# Patient Record
Sex: FEMALE | Race: WHITE | NOT HISPANIC OR LATINO | Employment: FULL TIME | URBAN - METROPOLITAN AREA
[De-identification: names, ages, dates, MRNs, and addresses within clinical notes are randomized per-mention and may not be internally consistent; named-entity substitution may affect disease eponyms.]

---

## 2017-09-17 ENCOUNTER — HOSPITAL ENCOUNTER (INPATIENT)
Facility: HOSPITAL | Age: 27
LOS: 1 days | Discharge: HOME | DRG: 189 | End: 2017-09-19
Attending: EMERGENCY MEDICINE | Admitting: INTERNAL MEDICINE
Payer: SUBSIDIZED

## 2017-09-17 DIAGNOSIS — R10.9 INTRACTABLE ABDOMINAL PAIN: Primary | ICD-10-CM

## 2017-09-17 DIAGNOSIS — R10.9 ABDOMINAL PAIN: ICD-10-CM

## 2017-09-17 LAB
AMORPH URATE CRY URNS QL MICRO: ABNORMAL /HPF
BACTERIA UR QL AUTO: ABNORMAL /HPF
BILIRUB UR QL STRIP: NEGATIVE
CLARITY UR: ABNORMAL
COLOR UR: ABNORMAL
EXT PREG TEST URINE: NEGATIVE
GLUCOSE UR STRIP-MCNC: NEGATIVE MG/DL
HGB UR QL STRIP.AUTO: ABNORMAL
KETONES UR STRIP-MCNC: NEGATIVE MG/DL
LEUKOCYTE ESTERASE UR QL STRIP: ABNORMAL
NITRITE UR QL STRIP: NEGATIVE
NON-SQ EPI CELLS URNS QL MICRO: ABNORMAL /HPF
PH UR STRIP.AUTO: 6.5 [PH] (ref 5–9)
PROT UR STRIP-MCNC: NEGATIVE MG/DL
RBC #/AREA URNS AUTO: ABNORMAL /HPF
SP GR UR STRIP.AUTO: 1.02 (ref 1–1.03)
UROBILINOGEN UR QL STRIP.AUTO: 0.2 E.U./DL
WBC #/AREA URNS AUTO: ABNORMAL /HPF

## 2017-09-17 PROCEDURE — 96361 HYDRATE IV INFUSION ADD-ON: CPT

## 2017-09-17 PROCEDURE — 96375 TX/PRO/DX INJ NEW DRUG ADDON: CPT

## 2017-09-17 PROCEDURE — 85025 COMPLETE CBC W/AUTO DIFF WBC: CPT | Performed by: EMERGENCY MEDICINE

## 2017-09-17 PROCEDURE — 81001 URINALYSIS AUTO W/SCOPE: CPT | Performed by: EMERGENCY MEDICINE

## 2017-09-17 PROCEDURE — 81025 URINE PREGNANCY TEST: CPT | Performed by: EMERGENCY MEDICINE

## 2017-09-17 PROCEDURE — 36415 COLL VENOUS BLD VENIPUNCTURE: CPT | Performed by: EMERGENCY MEDICINE

## 2017-09-17 PROCEDURE — 80053 COMPREHEN METABOLIC PANEL: CPT | Performed by: EMERGENCY MEDICINE

## 2017-09-17 PROCEDURE — 96374 THER/PROPH/DIAG INJ IV PUSH: CPT

## 2017-09-17 RX ORDER — KETOROLAC TROMETHAMINE 30 MG/ML
30 INJECTION, SOLUTION INTRAMUSCULAR; INTRAVENOUS ONCE
Status: COMPLETED | OUTPATIENT
Start: 2017-09-17 | End: 2017-09-17

## 2017-09-17 RX ORDER — MORPHINE SULFATE 2 MG/ML
2 INJECTION, SOLUTION INTRAMUSCULAR; INTRAVENOUS ONCE
Status: COMPLETED | OUTPATIENT
Start: 2017-09-17 | End: 2017-09-17

## 2017-09-17 RX ADMIN — SODIUM CHLORIDE 1000 ML: 0.9 INJECTION, SOLUTION INTRAVENOUS at 23:59

## 2017-09-17 RX ADMIN — MORPHINE SULFATE 2 MG: 2 INJECTION, SOLUTION INTRAMUSCULAR; INTRAVENOUS at 23:45

## 2017-09-17 RX ADMIN — KETOROLAC TROMETHAMINE 30 MG: 30 INJECTION, SOLUTION INTRAMUSCULAR at 23:45

## 2017-09-18 ENCOUNTER — APPOINTMENT (EMERGENCY)
Dept: RADIOLOGY | Facility: HOSPITAL | Age: 27
DRG: 189 | End: 2017-09-18
Payer: SUBSIDIZED

## 2017-09-18 ENCOUNTER — APPOINTMENT (INPATIENT)
Dept: RADIOLOGY | Facility: HOSPITAL | Age: 27
DRG: 189 | End: 2017-09-18
Payer: SUBSIDIZED

## 2017-09-18 PROBLEM — D72.829 LEUKOCYTOSIS: Status: ACTIVE | Noted: 2017-09-18

## 2017-09-18 PROBLEM — R10.9 ABDOMINAL PAIN: Status: ACTIVE | Noted: 2017-09-18

## 2017-09-18 LAB
ALBUMIN SERPL BCP-MCNC: 4.8 G/DL (ref 3.5–5)
ALP SERPL-CCNC: 51 U/L (ref 46–116)
ALT SERPL W P-5'-P-CCNC: 18 U/L (ref 12–78)
ANION GAP SERPL CALCULATED.3IONS-SCNC: 6 MMOL/L (ref 4–13)
ANION GAP SERPL CALCULATED.3IONS-SCNC: 8 MMOL/L (ref 4–13)
AST SERPL W P-5'-P-CCNC: 11 U/L (ref 5–45)
BASOPHILS # BLD AUTO: 0 THOUSANDS/ΜL (ref 0–0.1)
BASOPHILS NFR BLD AUTO: 0 % (ref 0–1)
BILIRUB SERPL-MCNC: 0.6 MG/DL (ref 0.2–1)
BUN SERPL-MCNC: 16 MG/DL (ref 5–25)
BUN SERPL-MCNC: 16 MG/DL (ref 5–25)
CALCIUM SERPL-MCNC: 8.7 MG/DL (ref 8.3–10.1)
CALCIUM SERPL-MCNC: 9.4 MG/DL (ref 8.3–10.1)
CHLORIDE SERPL-SCNC: 104 MMOL/L (ref 100–108)
CHLORIDE SERPL-SCNC: 106 MMOL/L (ref 100–108)
CO2 SERPL-SCNC: 27 MMOL/L (ref 21–32)
CO2 SERPL-SCNC: 28 MMOL/L (ref 21–32)
CREAT SERPL-MCNC: 0.69 MG/DL (ref 0.6–1.3)
CREAT SERPL-MCNC: 0.89 MG/DL (ref 0.6–1.3)
EOSINOPHIL # BLD AUTO: 0.2 THOUSAND/ΜL (ref 0–0.61)
EOSINOPHIL NFR BLD AUTO: 1 % (ref 0–6)
ERYTHROCYTE [DISTWIDTH] IN BLOOD BY AUTOMATED COUNT: 13.3 % (ref 11.6–15.1)
GFR SERPL CREATININE-BSD FRML MDRD: 121 ML/MIN/1.73SQ M
GFR SERPL CREATININE-BSD FRML MDRD: 90 ML/MIN/1.73SQ M
GLUCOSE P FAST SERPL-MCNC: 107 MG/DL (ref 65–99)
GLUCOSE SERPL-MCNC: 101 MG/DL (ref 65–140)
GLUCOSE SERPL-MCNC: 107 MG/DL (ref 65–140)
HCT VFR BLD AUTO: 41.6 % (ref 37–47)
HGB BLD-MCNC: 14.2 G/DL (ref 12–16)
LG PLATELETS BLD QL SMEAR: PRESENT
LYMPHOCYTES # BLD AUTO: 0.9 THOUSANDS/ΜL (ref 0.6–4.47)
LYMPHOCYTES NFR BLD AUTO: 8 % (ref 14–44)
MAGNESIUM SERPL-MCNC: 1.7 MG/DL (ref 1.6–2.6)
MCH RBC QN AUTO: 29.3 PG (ref 27–31)
MCHC RBC AUTO-ENTMCNC: 34.1 G/DL (ref 31.4–37.4)
MCV RBC AUTO: 86 FL (ref 82–98)
MONOCYTES # BLD AUTO: 0.2 THOUSAND/ΜL (ref 0.17–1.22)
MONOCYTES NFR BLD AUTO: 2 % (ref 4–12)
NEUTROPHILS # BLD AUTO: 10.2 THOUSANDS/ΜL (ref 1.85–7.62)
NEUTS SEG NFR BLD AUTO: 89 % (ref 43–75)
NRBC BLD AUTO-RTO: 0 /100 WBCS
OVALOCYTES BLD QL SMEAR: PRESENT
PLATELET # BLD AUTO: 250 THOUSANDS/UL (ref 130–400)
PLATELET BLD QL SMEAR: ADEQUATE
PMV BLD AUTO: 8 FL (ref 8.9–12.7)
POTASSIUM SERPL-SCNC: 3.6 MMOL/L (ref 3.5–5.3)
POTASSIUM SERPL-SCNC: 4.4 MMOL/L (ref 3.5–5.3)
PROT SERPL-MCNC: 7.5 G/DL (ref 6.4–8.2)
RBC # BLD AUTO: 4.84 MILLION/UL (ref 4.2–5.4)
SODIUM SERPL-SCNC: 139 MMOL/L (ref 136–145)
SODIUM SERPL-SCNC: 140 MMOL/L (ref 136–145)
WBC # BLD AUTO: 11.4 THOUSAND/UL (ref 4.8–10.8)

## 2017-09-18 PROCEDURE — 96361 HYDRATE IV INFUSION ADD-ON: CPT

## 2017-09-18 PROCEDURE — 76830 TRANSVAGINAL US NON-OB: CPT

## 2017-09-18 PROCEDURE — 80048 BASIC METABOLIC PNL TOTAL CA: CPT | Performed by: NURSE PRACTITIONER

## 2017-09-18 PROCEDURE — 74177 CT ABD & PELVIS W/CONTRAST: CPT

## 2017-09-18 PROCEDURE — 76856 US EXAM PELVIC COMPLETE: CPT

## 2017-09-18 PROCEDURE — 87081 CULTURE SCREEN ONLY: CPT | Performed by: NURSE PRACTITIONER

## 2017-09-18 PROCEDURE — 83735 ASSAY OF MAGNESIUM: CPT | Performed by: NURSE PRACTITIONER

## 2017-09-18 PROCEDURE — 96376 TX/PRO/DX INJ SAME DRUG ADON: CPT

## 2017-09-18 PROCEDURE — 99285 EMERGENCY DEPT VISIT HI MDM: CPT

## 2017-09-18 PROCEDURE — 74176 CT ABD & PELVIS W/O CONTRAST: CPT

## 2017-09-18 RX ORDER — MORPHINE SULFATE 4 MG/ML
4 INJECTION, SOLUTION INTRAMUSCULAR; INTRAVENOUS ONCE
Status: COMPLETED | OUTPATIENT
Start: 2017-09-18 | End: 2017-09-18

## 2017-09-18 RX ORDER — ONDANSETRON 2 MG/ML
4 INJECTION INTRAMUSCULAR; INTRAVENOUS EVERY 6 HOURS PRN
Status: DISCONTINUED | OUTPATIENT
Start: 2017-09-18 | End: 2017-09-19 | Stop reason: HOSPADM

## 2017-09-18 RX ORDER — ACETAMINOPHEN 325 MG/1
650 TABLET ORAL EVERY 6 HOURS PRN
Status: DISCONTINUED | OUTPATIENT
Start: 2017-09-18 | End: 2017-09-19 | Stop reason: HOSPADM

## 2017-09-18 RX ORDER — SODIUM CHLORIDE 9 MG/ML
100 INJECTION, SOLUTION INTRAVENOUS CONTINUOUS
Status: DISCONTINUED | OUTPATIENT
Start: 2017-09-18 | End: 2017-09-19 | Stop reason: HOSPADM

## 2017-09-18 RX ORDER — OXYCODONE HYDROCHLORIDE AND ACETAMINOPHEN 5; 325 MG/1; MG/1
1 TABLET ORAL EVERY 4 HOURS PRN
Status: DISCONTINUED | OUTPATIENT
Start: 2017-09-18 | End: 2017-09-19 | Stop reason: HOSPADM

## 2017-09-18 RX ADMIN — MORPHINE SULFATE 4 MG: 4 INJECTION, SOLUTION INTRAMUSCULAR; INTRAVENOUS at 00:44

## 2017-09-18 RX ADMIN — SODIUM CHLORIDE 100 ML/HR: 0.9 INJECTION, SOLUTION INTRAVENOUS at 06:04

## 2017-09-18 RX ADMIN — HYDROMORPHONE HYDROCHLORIDE 1 MG: 1 INJECTION, SOLUTION INTRAMUSCULAR; INTRAVENOUS; SUBCUTANEOUS at 03:31

## 2017-09-18 RX ADMIN — FAMOTIDINE 20 MG: 10 INJECTION, SOLUTION INTRAVENOUS at 17:50

## 2017-09-18 RX ADMIN — OXYCODONE HYDROCHLORIDE AND ACETAMINOPHEN 1 TABLET: 5; 325 TABLET ORAL at 08:32

## 2017-09-18 RX ADMIN — IOHEXOL 100 ML: 350 INJECTION, SOLUTION INTRAVENOUS at 02:41

## 2017-09-18 RX ADMIN — METRONIDAZOLE 500 MG: 500 INJECTION, SOLUTION INTRAVENOUS at 18:45

## 2017-09-18 RX ADMIN — CEFAZOLIN SODIUM 1000 MG: 1 SOLUTION INTRAVENOUS at 03:18

## 2017-09-18 RX ADMIN — CEFTRIAXONE 1000 MG: 1 INJECTION, POWDER, FOR SOLUTION INTRAMUSCULAR; INTRAVENOUS at 11:57

## 2017-09-18 RX ADMIN — METRONIDAZOLE 500 MG: 500 INJECTION, SOLUTION INTRAVENOUS at 11:00

## 2017-09-18 RX ADMIN — SODIUM CHLORIDE 100 ML/HR: 0.9 INJECTION, SOLUTION INTRAVENOUS at 17:50

## 2017-09-18 RX ADMIN — FAMOTIDINE 20 MG: 10 INJECTION, SOLUTION INTRAVENOUS at 08:31

## 2017-09-19 VITALS
HEIGHT: 72 IN | SYSTOLIC BLOOD PRESSURE: 118 MMHG | OXYGEN SATURATION: 99 % | RESPIRATION RATE: 16 BRPM | TEMPERATURE: 98.6 F | HEART RATE: 99 BPM | BODY MASS INDEX: 16.3 KG/M2 | DIASTOLIC BLOOD PRESSURE: 57 MMHG | WEIGHT: 120.37 LBS

## 2017-09-19 LAB
ALBUMIN SERPL BCP-MCNC: 3.5 G/DL (ref 3.5–5)
ALP SERPL-CCNC: 43 U/L (ref 46–116)
ALT SERPL W P-5'-P-CCNC: 22 U/L (ref 12–78)
ANION GAP SERPL CALCULATED.3IONS-SCNC: 10 MMOL/L (ref 4–13)
AST SERPL W P-5'-P-CCNC: 10 U/L (ref 5–45)
BASOPHILS # BLD AUTO: 0 THOUSANDS/ΜL (ref 0–0.1)
BASOPHILS NFR BLD AUTO: 1 % (ref 0–1)
BILIRUB SERPL-MCNC: 0.6 MG/DL (ref 0.2–1)
BUN SERPL-MCNC: 15 MG/DL (ref 5–25)
CALCIUM SERPL-MCNC: 8.7 MG/DL (ref 8.3–10.1)
CHLORIDE SERPL-SCNC: 109 MMOL/L (ref 100–108)
CO2 SERPL-SCNC: 22 MMOL/L (ref 21–32)
CREAT SERPL-MCNC: 0.65 MG/DL (ref 0.6–1.3)
CRP SERPL QL: 49.3 MG/L
EOSINOPHIL # BLD AUTO: 0.2 THOUSAND/ΜL (ref 0–0.61)
EOSINOPHIL NFR BLD AUTO: 4 % (ref 0–6)
ERYTHROCYTE [DISTWIDTH] IN BLOOD BY AUTOMATED COUNT: 12.4 % (ref 11.6–15.1)
ERYTHROCYTE [SEDIMENTATION RATE] IN BLOOD: 5 MM/HOUR (ref 2–25)
GFR SERPL CREATININE-BSD FRML MDRD: 124 ML/MIN/1.73SQ M
GLUCOSE SERPL-MCNC: 71 MG/DL (ref 65–140)
HCT VFR BLD AUTO: 33.9 % (ref 37–47)
HGB BLD-MCNC: 11.5 G/DL (ref 12–16)
LYMPHOCYTES # BLD AUTO: 1.3 THOUSANDS/ΜL (ref 0.6–4.47)
LYMPHOCYTES NFR BLD AUTO: 32 % (ref 14–44)
MCH RBC QN AUTO: 28.7 PG (ref 27–31)
MCHC RBC AUTO-ENTMCNC: 33.8 G/DL (ref 31.4–37.4)
MCV RBC AUTO: 85 FL (ref 82–98)
MONOCYTES # BLD AUTO: 0.4 THOUSAND/ΜL (ref 0.17–1.22)
MONOCYTES NFR BLD AUTO: 9 % (ref 4–12)
NEUTROPHILS # BLD AUTO: 2.2 THOUSANDS/ΜL (ref 1.85–7.62)
NEUTS SEG NFR BLD AUTO: 54 % (ref 43–75)
PLATELET # BLD AUTO: 178 THOUSANDS/UL (ref 130–400)
PMV BLD AUTO: 8.5 FL (ref 8.9–12.7)
POTASSIUM SERPL-SCNC: 4 MMOL/L (ref 3.5–5.3)
PROT SERPL-MCNC: 5.9 G/DL (ref 6.4–8.2)
RBC # BLD AUTO: 4 MILLION/UL (ref 4.2–5.4)
SODIUM SERPL-SCNC: 141 MMOL/L (ref 136–145)
WBC # BLD AUTO: 4.1 THOUSAND/UL (ref 4.8–10.8)

## 2017-09-19 PROCEDURE — 85025 COMPLETE CBC W/AUTO DIFF WBC: CPT | Performed by: SPECIALIST

## 2017-09-19 PROCEDURE — 80053 COMPREHEN METABOLIC PANEL: CPT | Performed by: INTERNAL MEDICINE

## 2017-09-19 PROCEDURE — 86140 C-REACTIVE PROTEIN: CPT | Performed by: PHYSICIAN ASSISTANT

## 2017-09-19 PROCEDURE — 85652 RBC SED RATE AUTOMATED: CPT | Performed by: PHYSICIAN ASSISTANT

## 2017-09-19 RX ORDER — CIPROFLOXACIN 500 MG/1
500 TABLET, FILM COATED ORAL EVERY 12 HOURS SCHEDULED
Qty: 20 TABLET | Refills: 0 | Status: SHIPPED | OUTPATIENT
Start: 2017-09-19 | End: 2017-09-24

## 2017-09-19 RX ORDER — METRONIDAZOLE 500 MG/1
500 TABLET ORAL EVERY 8 HOURS SCHEDULED
Qty: 30 TABLET | Refills: 0 | Status: SHIPPED | OUTPATIENT
Start: 2017-09-19 | End: 2017-09-24

## 2017-09-19 RX ADMIN — METRONIDAZOLE 500 MG: 500 INJECTION, SOLUTION INTRAVENOUS at 03:45

## 2017-09-19 RX ADMIN — METRONIDAZOLE 500 MG: 500 INJECTION, SOLUTION INTRAVENOUS at 14:12

## 2017-09-19 RX ADMIN — CEFTRIAXONE 1000 MG: 1 INJECTION, POWDER, FOR SOLUTION INTRAMUSCULAR; INTRAVENOUS at 12:59

## 2017-09-19 RX ADMIN — SODIUM CHLORIDE 100 ML/HR: 0.9 INJECTION, SOLUTION INTRAVENOUS at 07:01

## 2017-09-19 RX ADMIN — FAMOTIDINE 20 MG: 10 INJECTION, SOLUTION INTRAVENOUS at 09:42

## 2017-09-20 LAB — MRSA NOSE QL CULT: NORMAL

## 2019-09-18 ENCOUNTER — ANESTHESIA (INPATIENT)
Dept: PERIOP | Facility: HOSPITAL | Age: 29
End: 2019-09-18
Payer: COMMERCIAL

## 2019-09-18 ENCOUNTER — ANESTHESIA EVENT (INPATIENT)
Dept: PERIOP | Facility: HOSPITAL | Age: 29
End: 2019-09-18
Payer: COMMERCIAL

## 2019-09-18 ENCOUNTER — APPOINTMENT (EMERGENCY)
Dept: RADIOLOGY | Facility: HOSPITAL | Age: 29
End: 2019-09-18
Payer: COMMERCIAL

## 2019-09-18 ENCOUNTER — HOSPITAL ENCOUNTER (OUTPATIENT)
Facility: HOSPITAL | Age: 29
LOS: 1 days | Discharge: HOME | End: 2019-09-19
Attending: EMERGENCY MEDICINE | Admitting: SURGERY
Payer: COMMERCIAL

## 2019-09-18 DIAGNOSIS — K37 APPENDICITIS: Primary | ICD-10-CM

## 2019-09-18 DIAGNOSIS — N83.209 OVARIAN CYST: ICD-10-CM

## 2019-09-18 LAB
ALBUMIN SERPL BCP-MCNC: 4.6 G/DL (ref 3.5–5)
ALP SERPL-CCNC: 54 U/L (ref 46–116)
ALT SERPL W P-5'-P-CCNC: 23 U/L (ref 12–78)
ANION GAP SERPL CALCULATED.3IONS-SCNC: 9 MMOL/L (ref 4–13)
AST SERPL W P-5'-P-CCNC: 11 U/L (ref 5–45)
BACTERIA UR QL AUTO: ABNORMAL /HPF
BASOPHILS # BLD AUTO: 0.06 THOUSANDS/ΜL (ref 0–0.1)
BASOPHILS NFR BLD AUTO: 0 % (ref 0–1)
BILIRUB SERPL-MCNC: 1 MG/DL (ref 0.2–1)
BILIRUB UR QL STRIP: NEGATIVE
BUN SERPL-MCNC: 11 MG/DL (ref 5–25)
CALCIUM SERPL-MCNC: 8.8 MG/DL (ref 8.3–10.1)
CHLORIDE SERPL-SCNC: 102 MMOL/L (ref 100–108)
CLARITY UR: CLEAR
CO2 SERPL-SCNC: 26 MMOL/L (ref 21–32)
COLOR UR: YELLOW
CREAT SERPL-MCNC: 0.8 MG/DL (ref 0.6–1.3)
EOSINOPHIL # BLD AUTO: 0.06 THOUSAND/ΜL (ref 0–0.61)
EOSINOPHIL NFR BLD AUTO: 0 % (ref 0–6)
ERYTHROCYTE [DISTWIDTH] IN BLOOD BY AUTOMATED COUNT: 11.9 % (ref 11.6–15.1)
EXT PREG TEST URINE: NEGATIVE
EXT. CONTROL ED NAV: NORMAL
GFR SERPL CREATININE-BSD FRML MDRD: 101 ML/MIN/1.73SQ M
GLUCOSE SERPL-MCNC: 110 MG/DL (ref 65–140)
GLUCOSE UR STRIP-MCNC: NEGATIVE MG/DL
HCT VFR BLD AUTO: 39.6 % (ref 34.8–46.1)
HGB BLD-MCNC: 13.3 G/DL (ref 11.5–15.4)
HGB UR QL STRIP.AUTO: NEGATIVE
IMM GRANULOCYTES # BLD AUTO: 0.04 THOUSAND/UL (ref 0–0.2)
IMM GRANULOCYTES NFR BLD AUTO: 0 % (ref 0–2)
KETONES UR STRIP-MCNC: NEGATIVE MG/DL
LEUKOCYTE ESTERASE UR QL STRIP: ABNORMAL
LIPASE SERPL-CCNC: 71 U/L (ref 73–393)
LYMPHOCYTES # BLD AUTO: 1.35 THOUSANDS/ΜL (ref 0.6–4.47)
LYMPHOCYTES NFR BLD AUTO: 10 % (ref 14–44)
MCH RBC QN AUTO: 29.6 PG (ref 26.8–34.3)
MCHC RBC AUTO-ENTMCNC: 33.6 G/DL (ref 31.4–37.4)
MCV RBC AUTO: 88 FL (ref 82–98)
MONOCYTES # BLD AUTO: 1.02 THOUSAND/ΜL (ref 0.17–1.22)
MONOCYTES NFR BLD AUTO: 7 % (ref 4–12)
NEUTROPHILS # BLD AUTO: 11.31 THOUSANDS/ΜL (ref 1.85–7.62)
NEUTS SEG NFR BLD AUTO: 83 % (ref 43–75)
NITRITE UR QL STRIP: NEGATIVE
NON-SQ EPI CELLS URNS QL MICRO: ABNORMAL /HPF
NRBC BLD AUTO-RTO: 0 /100 WBCS
PH UR STRIP.AUTO: 6.5 [PH]
PLATELET # BLD AUTO: 238 THOUSANDS/UL (ref 149–390)
PMV BLD AUTO: 10.1 FL (ref 8.9–12.7)
POTASSIUM SERPL-SCNC: 3.6 MMOL/L (ref 3.5–5.3)
PROT SERPL-MCNC: 7.5 G/DL (ref 6.4–8.2)
PROT UR STRIP-MCNC: NEGATIVE MG/DL
RBC # BLD AUTO: 4.5 MILLION/UL (ref 3.81–5.12)
RBC #/AREA URNS AUTO: ABNORMAL /HPF
SODIUM SERPL-SCNC: 137 MMOL/L (ref 136–145)
SP GR UR STRIP.AUTO: 1.01 (ref 1–1.03)
UROBILINOGEN UR QL STRIP.AUTO: 0.2 E.U./DL
WBC # BLD AUTO: 13.84 THOUSAND/UL (ref 4.31–10.16)
WBC #/AREA URNS AUTO: ABNORMAL /HPF

## 2019-09-18 PROCEDURE — 96374 THER/PROPH/DIAG INJ IV PUSH: CPT

## 2019-09-18 PROCEDURE — 96375 TX/PRO/DX INJ NEW DRUG ADDON: CPT

## 2019-09-18 PROCEDURE — 36415 COLL VENOUS BLD VENIPUNCTURE: CPT | Performed by: EMERGENCY MEDICINE

## 2019-09-18 PROCEDURE — 80053 COMPREHEN METABOLIC PANEL: CPT | Performed by: EMERGENCY MEDICINE

## 2019-09-18 PROCEDURE — 83690 ASSAY OF LIPASE: CPT | Performed by: EMERGENCY MEDICINE

## 2019-09-18 PROCEDURE — 99285 EMERGENCY DEPT VISIT HI MDM: CPT

## 2019-09-18 PROCEDURE — 44970 LAPAROSCOPY APPENDECTOMY: CPT | Performed by: PHYSICIAN ASSISTANT

## 2019-09-18 PROCEDURE — 87081 CULTURE SCREEN ONLY: CPT | Performed by: SURGERY

## 2019-09-18 PROCEDURE — 88304 TISSUE EXAM BY PATHOLOGIST: CPT | Performed by: PATHOLOGY

## 2019-09-18 PROCEDURE — 74177 CT ABD & PELVIS W/CONTRAST: CPT

## 2019-09-18 PROCEDURE — 81001 URINALYSIS AUTO W/SCOPE: CPT | Performed by: EMERGENCY MEDICINE

## 2019-09-18 PROCEDURE — 99220 PR INITIAL OBSERVATION CARE/DAY 70 MINUTES: CPT | Performed by: SURGERY

## 2019-09-18 PROCEDURE — 85025 COMPLETE CBC W/AUTO DIFF WBC: CPT | Performed by: EMERGENCY MEDICINE

## 2019-09-18 PROCEDURE — 81025 URINE PREGNANCY TEST: CPT | Performed by: EMERGENCY MEDICINE

## 2019-09-18 PROCEDURE — 44970 LAPAROSCOPY APPENDECTOMY: CPT | Performed by: SURGERY

## 2019-09-18 RX ORDER — NEOSTIGMINE METHYLSULFATE 1 MG/ML
INJECTION INTRAVENOUS AS NEEDED
Status: DISCONTINUED | OUTPATIENT
Start: 2019-09-18 | End: 2019-09-18 | Stop reason: SURG

## 2019-09-18 RX ORDER — GLYCOPYRROLATE 0.2 MG/ML
INJECTION INTRAMUSCULAR; INTRAVENOUS AS NEEDED
Status: DISCONTINUED | OUTPATIENT
Start: 2019-09-18 | End: 2019-09-18 | Stop reason: SURG

## 2019-09-18 RX ORDER — SODIUM CHLORIDE 9 MG/ML
125 INJECTION, SOLUTION INTRAVENOUS CONTINUOUS
Status: DISCONTINUED | OUTPATIENT
Start: 2019-09-18 | End: 2019-09-18

## 2019-09-18 RX ORDER — FENTANYL CITRATE 50 UG/ML
INJECTION, SOLUTION INTRAMUSCULAR; INTRAVENOUS AS NEEDED
Status: DISCONTINUED | OUTPATIENT
Start: 2019-09-18 | End: 2019-09-18 | Stop reason: SURG

## 2019-09-18 RX ORDER — KETOROLAC TROMETHAMINE 30 MG/ML
15 INJECTION, SOLUTION INTRAMUSCULAR; INTRAVENOUS EVERY 6 HOURS
Status: DISCONTINUED | OUTPATIENT
Start: 2019-09-18 | End: 2019-09-19 | Stop reason: HOSPADM

## 2019-09-18 RX ORDER — OXYCODONE HYDROCHLORIDE AND ACETAMINOPHEN 5; 325 MG/1; MG/1
1 TABLET ORAL EVERY 4 HOURS PRN
Status: DISCONTINUED | OUTPATIENT
Start: 2019-09-18 | End: 2019-09-19 | Stop reason: HOSPADM

## 2019-09-18 RX ORDER — MIDAZOLAM HYDROCHLORIDE 1 MG/ML
INJECTION INTRAMUSCULAR; INTRAVENOUS AS NEEDED
Status: DISCONTINUED | OUTPATIENT
Start: 2019-09-18 | End: 2019-09-18 | Stop reason: SURG

## 2019-09-18 RX ORDER — LEVOFLOXACIN 5 MG/ML
750 INJECTION, SOLUTION INTRAVENOUS ONCE
Status: COMPLETED | OUTPATIENT
Start: 2019-09-18 | End: 2019-09-19

## 2019-09-18 RX ORDER — SODIUM CHLORIDE 9 MG/ML
75 INJECTION, SOLUTION INTRAVENOUS CONTINUOUS
Status: DISCONTINUED | OUTPATIENT
Start: 2019-09-18 | End: 2019-09-19 | Stop reason: HOSPADM

## 2019-09-18 RX ORDER — SODIUM CHLORIDE, SODIUM LACTATE, POTASSIUM CHLORIDE, CALCIUM CHLORIDE 600; 310; 30; 20 MG/100ML; MG/100ML; MG/100ML; MG/100ML
INJECTION, SOLUTION INTRAVENOUS CONTINUOUS PRN
Status: DISCONTINUED | OUTPATIENT
Start: 2019-09-18 | End: 2019-09-18 | Stop reason: SURG

## 2019-09-18 RX ORDER — ONDANSETRON 2 MG/ML
INJECTION INTRAMUSCULAR; INTRAVENOUS AS NEEDED
Status: DISCONTINUED | OUTPATIENT
Start: 2019-09-18 | End: 2019-09-18 | Stop reason: SURG

## 2019-09-18 RX ORDER — OXYCODONE HYDROCHLORIDE AND ACETAMINOPHEN 5; 325 MG/1; MG/1
1 TABLET ORAL ONCE
Status: COMPLETED | OUTPATIENT
Start: 2019-09-18 | End: 2019-09-18

## 2019-09-18 RX ORDER — MAGNESIUM HYDROXIDE 1200 MG/15ML
LIQUID ORAL AS NEEDED
Status: DISCONTINUED | OUTPATIENT
Start: 2019-09-18 | End: 2019-09-18 | Stop reason: HOSPADM

## 2019-09-18 RX ORDER — BUPIVACAINE HYDROCHLORIDE 5 MG/ML
INJECTION, SOLUTION PERINEURAL AS NEEDED
Status: DISCONTINUED | OUTPATIENT
Start: 2019-09-18 | End: 2019-09-18 | Stop reason: HOSPADM

## 2019-09-18 RX ORDER — LIDOCAINE HYDROCHLORIDE 10 MG/ML
INJECTION, SOLUTION INFILTRATION; PERINEURAL AS NEEDED
Status: DISCONTINUED | OUTPATIENT
Start: 2019-09-18 | End: 2019-09-18 | Stop reason: SURG

## 2019-09-18 RX ORDER — PROPOFOL 10 MG/ML
INJECTION, EMULSION INTRAVENOUS AS NEEDED
Status: DISCONTINUED | OUTPATIENT
Start: 2019-09-18 | End: 2019-09-18 | Stop reason: SURG

## 2019-09-18 RX ORDER — LEVOFLOXACIN 5 MG/ML
750 INJECTION, SOLUTION INTRAVENOUS EVERY 24 HOURS
Status: DISCONTINUED | OUTPATIENT
Start: 2019-09-19 | End: 2019-09-19 | Stop reason: HOSPADM

## 2019-09-18 RX ORDER — HYDROMORPHONE HCL/PF 1 MG/ML
0.5 SYRINGE (ML) INJECTION EVERY 4 HOURS PRN
Status: DISCONTINUED | OUTPATIENT
Start: 2019-09-18 | End: 2019-09-19 | Stop reason: HOSPADM

## 2019-09-18 RX ORDER — KETOROLAC TROMETHAMINE 30 MG/ML
15 INJECTION, SOLUTION INTRAMUSCULAR; INTRAVENOUS ONCE
Status: COMPLETED | OUTPATIENT
Start: 2019-09-18 | End: 2019-09-18

## 2019-09-18 RX ORDER — FENTANYL CITRATE/PF 50 MCG/ML
25 SYRINGE (ML) INJECTION
Status: COMPLETED | OUTPATIENT
Start: 2019-09-18 | End: 2019-09-18

## 2019-09-18 RX ORDER — DEXAMETHASONE SODIUM PHOSPHATE 4 MG/ML
INJECTION, SOLUTION INTRA-ARTICULAR; INTRALESIONAL; INTRAMUSCULAR; INTRAVENOUS; SOFT TISSUE AS NEEDED
Status: DISCONTINUED | OUTPATIENT
Start: 2019-09-18 | End: 2019-09-18 | Stop reason: SURG

## 2019-09-18 RX ORDER — MORPHINE SULFATE 4 MG/ML
4 INJECTION, SOLUTION INTRAMUSCULAR; INTRAVENOUS ONCE
Status: COMPLETED | OUTPATIENT
Start: 2019-09-18 | End: 2019-09-18

## 2019-09-18 RX ORDER — ROCURONIUM BROMIDE 10 MG/ML
INJECTION, SOLUTION INTRAVENOUS AS NEEDED
Status: DISCONTINUED | OUTPATIENT
Start: 2019-09-18 | End: 2019-09-18 | Stop reason: SURG

## 2019-09-18 RX ORDER — ONDANSETRON 2 MG/ML
4 INJECTION INTRAMUSCULAR; INTRAVENOUS ONCE
Status: COMPLETED | OUTPATIENT
Start: 2019-09-18 | End: 2019-09-18

## 2019-09-18 RX ORDER — ONDANSETRON 2 MG/ML
4 INJECTION INTRAMUSCULAR; INTRAVENOUS EVERY 6 HOURS PRN
Status: DISCONTINUED | OUTPATIENT
Start: 2019-09-18 | End: 2019-09-19 | Stop reason: HOSPADM

## 2019-09-18 RX ORDER — ONDANSETRON 2 MG/ML
4 INJECTION INTRAMUSCULAR; INTRAVENOUS ONCE AS NEEDED
Status: DISCONTINUED | OUTPATIENT
Start: 2019-09-18 | End: 2019-09-18 | Stop reason: HOSPADM

## 2019-09-18 RX ORDER — OXYCODONE HYDROCHLORIDE AND ACETAMINOPHEN 5; 325 MG/1; MG/1
1 TABLET ORAL EVERY 4 HOURS PRN
Qty: 12 TABLET | Refills: 0 | Status: SHIPPED | OUTPATIENT
Start: 2019-09-18 | End: 2019-09-28

## 2019-09-18 RX ADMIN — OXYCODONE HYDROCHLORIDE AND ACETAMINOPHEN 1 TABLET: 5; 325 TABLET ORAL at 08:16

## 2019-09-18 RX ADMIN — SODIUM CHLORIDE 75 ML/HR: 0.9 INJECTION, SOLUTION INTRAVENOUS at 22:13

## 2019-09-18 RX ADMIN — SODIUM CHLORIDE, SODIUM LACTATE, POTASSIUM CHLORIDE, AND CALCIUM CHLORIDE: .6; .31; .03; .02 INJECTION, SOLUTION INTRAVENOUS at 10:09

## 2019-09-18 RX ADMIN — IOHEXOL 100 ML: 350 INJECTION, SOLUTION INTRAVENOUS at 03:27

## 2019-09-18 RX ADMIN — MORPHINE SULFATE 4 MG: 4 INJECTION INTRAVENOUS at 05:07

## 2019-09-18 RX ADMIN — MIDAZOLAM HYDROCHLORIDE 1 MG: 1 INJECTION, SOLUTION INTRAMUSCULAR; INTRAVENOUS at 10:09

## 2019-09-18 RX ADMIN — GLYCOPYRROLATE 0.6 MG: 0.2 INJECTION, SOLUTION INTRAMUSCULAR; INTRAVENOUS at 10:56

## 2019-09-18 RX ADMIN — SODIUM CHLORIDE 125 ML/HR: 0.9 INJECTION, SOLUTION INTRAVENOUS at 08:14

## 2019-09-18 RX ADMIN — FENTANYL CITRATE 25 MCG: 50 INJECTION INTRAMUSCULAR; INTRAVENOUS at 11:59

## 2019-09-18 RX ADMIN — ONDANSETRON 4 MG: 2 INJECTION INTRAMUSCULAR; INTRAVENOUS at 02:41

## 2019-09-18 RX ADMIN — ROCURONIUM BROMIDE 40 MG: 10 SOLUTION INTRAVENOUS at 10:15

## 2019-09-18 RX ADMIN — OXYCODONE HYDROCHLORIDE AND ACETAMINOPHEN 1 TABLET: 5; 325 TABLET ORAL at 15:33

## 2019-09-18 RX ADMIN — SODIUM CHLORIDE 1000 ML: 0.9 INJECTION, SOLUTION INTRAVENOUS at 04:58

## 2019-09-18 RX ADMIN — FENTANYL CITRATE 25 MCG: 50 INJECTION, SOLUTION INTRAMUSCULAR; INTRAVENOUS at 10:07

## 2019-09-18 RX ADMIN — PROPOFOL 200 MG: 10 INJECTION, EMULSION INTRAVENOUS at 10:14

## 2019-09-18 RX ADMIN — ENOXAPARIN SODIUM 40 MG: 40 INJECTION SUBCUTANEOUS at 08:16

## 2019-09-18 RX ADMIN — HYDROMORPHONE HYDROCHLORIDE 0.5 MG: 1 INJECTION, SOLUTION INTRAMUSCULAR; INTRAVENOUS; SUBCUTANEOUS at 18:05

## 2019-09-18 RX ADMIN — MIDAZOLAM HYDROCHLORIDE 1 MG: 1 INJECTION, SOLUTION INTRAMUSCULAR; INTRAVENOUS at 10:07

## 2019-09-18 RX ADMIN — FENTANYL CITRATE 25 MCG: 50 INJECTION INTRAMUSCULAR; INTRAVENOUS at 11:54

## 2019-09-18 RX ADMIN — NEOSTIGMINE METHYLSULFATE 4 MG: 1 INJECTION INTRAVENOUS at 10:56

## 2019-09-18 RX ADMIN — METRONIDAZOLE 500 MG: 500 INJECTION, SOLUTION INTRAVENOUS at 06:12

## 2019-09-18 RX ADMIN — KETOROLAC TROMETHAMINE 15 MG: 30 INJECTION INTRAMUSCULAR; INTRAVENOUS at 09:47

## 2019-09-18 RX ADMIN — LEVOFLOXACIN 750 MG: 5 INJECTION, SOLUTION INTRAVENOUS at 04:57

## 2019-09-18 RX ADMIN — KETOROLAC TROMETHAMINE 15 MG: 30 INJECTION INTRAMUSCULAR; INTRAVENOUS at 15:34

## 2019-09-18 RX ADMIN — ONDANSETRON 4 MG: 2 INJECTION INTRAMUSCULAR; INTRAVENOUS at 10:20

## 2019-09-18 RX ADMIN — FENTANYL CITRATE 25 MCG: 50 INJECTION, SOLUTION INTRAMUSCULAR; INTRAVENOUS at 10:23

## 2019-09-18 RX ADMIN — KETOROLAC TROMETHAMINE 15 MG: 30 INJECTION INTRAMUSCULAR; INTRAVENOUS at 22:05

## 2019-09-18 RX ADMIN — FENTANYL CITRATE 50 MCG: 50 INJECTION, SOLUTION INTRAMUSCULAR; INTRAVENOUS at 11:16

## 2019-09-18 RX ADMIN — FENTANYL CITRATE 75 MCG: 50 INJECTION, SOLUTION INTRAMUSCULAR; INTRAVENOUS at 10:14

## 2019-09-18 RX ADMIN — KETOROLAC TROMETHAMINE 15 MG: 30 INJECTION INTRAMUSCULAR; INTRAVENOUS at 03:53

## 2019-09-18 RX ADMIN — METRONIDAZOLE 500 MG: 500 INJECTION, SOLUTION INTRAVENOUS at 22:06

## 2019-09-18 RX ADMIN — LIDOCAINE HYDROCHLORIDE 50 MG: 10 INJECTION, SOLUTION INFILTRATION; PERINEURAL at 10:14

## 2019-09-18 RX ADMIN — FENTANYL CITRATE 25 MCG: 50 INJECTION INTRAMUSCULAR; INTRAVENOUS at 11:49

## 2019-09-18 RX ADMIN — DEXAMETHASONE SODIUM PHOSPHATE 8 MG: 4 INJECTION, SOLUTION INTRA-ARTICULAR; INTRALESIONAL; INTRAMUSCULAR; INTRAVENOUS; SOFT TISSUE at 10:20

## 2019-09-18 RX ADMIN — FENTANYL CITRATE 25 MCG: 50 INJECTION INTRAMUSCULAR; INTRAVENOUS at 12:05

## 2019-09-18 NOTE — H&P
H&P Exam - General Surgery   Gurmeet Wolff 33 y o  female MRN: [id number]  Unit/Bed#: [address] [place] [address] Encounter: [id number]    Assessment/Plan     Assessment:  1) Acute Appendicitis - CT scan suggestive findings of acute appendicitis with inflammatory changes and dilation, patient does also have leukocytosis of 13 55, Castillo score index of 7/10, significant right lower quadrant tenderness, has been treated with conservative measures approximately 4 times and has been found to have recurrence of right lower quadrant tenderness, initially there was some skewed component due to prior ovarian cyst rupture but due to the degree of recurrence in the inflammatory changes on CT scan likely has acute appendicitis, suggestive of retrocecal appendix based on psoas sign      Plan:  1) Acute Appendicitis -   - NPO  - hold DVT prophylaxis  - case request for laparoscopic appendectomy  - levo and Flagyl for surgical prophylaxis  - p r n  Analgesia  - p r n  Zofran  - consent to be obtained by Dr Vicky Medina  - IV fluids  - reviewed CT scan    History of Present Illness   HPI:  Gurmeet Wolff is a 33 y o  female a past medical history of ovarian cyst that was suspected to have ruptured ovarian cyst in the past presenting to the acute care surgery team due to recurrent right lower quadrant pain  Patient reports that she has had waxing and waning abdominal pain over the course approximately 2 years  Patient reports she has these acute flares that cause pain in the right lower quadrant and sometimes radiates to the upper right quadrant  Patient reports that she has been treated with conservative management in the past and improved but since then will recur with the pain    Patient reports this time feels very similar to what had happened last time in that she had the onset of a generalized abdominal pain that was migratory to the right lower quadrant and although last time it was thought or suspected to be an ovarian cyst that had ruptured that this is become little bit more significant in tenderness  Patient reports the pain is more like a sharp and stabbing pain in the right lower quadrant with a gas see aching pain that radiates to the right upper quadrant at times  Patient reports that it is moderate to severe in nature depending on positioning  Patient reports that she also has some nausea as well as anorexia  Patient has not had any vomiting  Patient does have fevers and chills  Patient had wanted to proceed with conservative measures in the past but believes that due to the level of recurrence with the pain being in the right lower quadrant if appendix is suggested on CT scan that she is willing to pursue surgical means  Patient denies any other significant finding such as change in dietary intake, contaminated water space is cut, contaminated food, recent antibiotics  Patient also denies any changes in her bowel movements  No diarrhea or melena  Patient does report a pain that seemed a little like bladder pain but denies any dysuria  Patient also reports pain with flexion and extension of leg that sound suggestive somewhat of psoas sign  Review of Systems   Constitutional: Positive for appetite change, chills and fever  Negative for fatigue  Respiratory: Negative for cough, chest tightness, shortness of breath and wheezing  Cardiovascular: Negative for chest pain and palpitations  Gastrointestinal: Positive for abdominal pain and nausea  Negative for abdominal distention, blood in stool, diarrhea and vomiting  Genitourinary: Negative for difficulty urinating, dysuria and flank pain  Musculoskeletal: Negative for arthralgias, joint swelling, myalgias and neck stiffness  Skin: Negative for color change and wound  Allergic/Immunologic: Negative for food allergies and immunocompromised state  Neurological: Negative for dizziness, seizures, weakness and numbness         Historical Information   Past Medical History:   Diagnosis Date    Ovarian cyst      History reviewed  No pertinent surgical history  Social History   Social History     Substance and Sexual Activity   Alcohol Use Yes    Alcohol/week: 1 0 standard drinks    Types: 1 Cans of beer per week    Frequency: 2-4 times a month    Drinks per session: 1 or 2    Comment: rarely     Social History     Substance and Sexual Activity   Drug Use No     Social History     Tobacco Use   Smoking Status Never Smoker   Smokeless Tobacco Never Used     Family History: non-contributory    Meds/Allergies   all medications and allergies reviewed  Allergies   Allergen Reactions    Penicillins Rash       Objective   First Vitals:   Blood Pressure: 148/89 (09/18/19 0230)  Pulse: (!) 108 (09/18/19 0230)  Temperature: 98 6 °F (37 °C) (09/18/19 0230)  Temp Source: Oral (09/18/19 0230)  Respirations: 18 (09/18/19 0230)  Height: 5' 11" (180 3 cm) (09/18/19 0530)  Weight - Scale: 70 3 kg (155 lb) (09/18/19 0516)  SpO2: 98 % (09/18/19 0230)    Current Vitals:   Blood Pressure: 112/90 (09/18/19 0807)  Pulse: 87 (09/18/19 0807)  Temperature: 97 7 °F (36 5 °C) (09/18/19 0807)  Temp Source: Oral (09/18/19 0807)  Respirations: 16 (09/18/19 0807)  Height: 5' 11" (180 3 cm) (09/18/19 0530)  Weight - Scale: 71 3 kg (157 lb 3 oz) (09/18/19 0530)  SpO2: 100 % (09/18/19 0807)    No intake or output data in the 24 hours ending 09/18/19 0930    Invasive Devices     Peripheral Intravenous Line            Peripheral IV 09/18/19 Right Antecubital less than 1 day                Physical Exam   Constitutional: She is oriented to person, place, and time  Vital signs are normal  She appears well-developed and well-nourished  She is cooperative  She does not have a sickly appearance  She does not appear ill  No distress  She is not intubated  HENT:   Head: Normocephalic and atraumatic     Right Ear: Hearing and external ear normal    Left Ear: Hearing and external ear normal    Nose: Nose normal  Cardiovascular: Normal rate, regular rhythm, S1 normal, S2 normal and normal heart sounds  No murmur heard  Pulmonary/Chest: Effort normal and breath sounds normal  No accessory muscle usage or stridor  No apnea, no tachypnea and no bradypnea  She is not intubated  No respiratory distress  She has no decreased breath sounds  She has no wheezes  She has no rhonchi  She has no rales  Abdominal: Soft  Normal appearance and bowel sounds are normal  She exhibits distension  There is tenderness in the right lower quadrant  There is guarding and positive Weiner's sign  There is no rigidity  No hernia  Neurological: She is alert and oriented to person, place, and time  She is not disoriented  GCS eye subscore is 4  GCS verbal subscore is 5  GCS motor subscore is 6  Skin: Skin is warm, dry and intact  Capillary refill takes less than 2 seconds  Lab Results:   I have personally reviewed pertinent lab results  , CBC:   Lab Results   Component Value Date    WBC 13 84 (H) 09/18/2019    HGB 13 3 09/18/2019    HCT 39 6 09/18/2019    MCV 88 09/18/2019     09/18/2019    MCH 29 6 09/18/2019    MCHC 33 6 09/18/2019    RDW 11 9 09/18/2019    MPV 10 1 09/18/2019    NRBC 0 09/18/2019   , CMP:   Lab Results   Component Value Date    SODIUM 137 09/18/2019    K 3 6 09/18/2019     09/18/2019    CO2 26 09/18/2019    BUN 11 09/18/2019    CREATININE 0 80 09/18/2019    CALCIUM 8 8 09/18/2019    AST 11 09/18/2019    ALT 23 09/18/2019    ALKPHOS 54 09/18/2019    EGFR 101 09/18/2019     Imaging: I have personally reviewed pertinent reports  EKG, Pathology, and Other Studies: I have personally reviewed pertinent reports  Code Status: Level 1 - Full Code  Advance Directive and Living Will:      Power of :    POLST:      Counseling / Coordination of Care  Total floor / unit time spent today 35 minutes    Greater than 50% of total time was spent with the patient and / or family counseling and / or coordination of care  A description of the counseling / coordination of care:  Developing plan, reviewing with attending, coordinating care, physical exam, history taking, patient education, reviewing labs, reviewing imaging

## 2019-09-18 NOTE — ANESTHESIA PREPROCEDURE EVALUATION
Review of Systems/Medical History  Patient summary reviewed  Chart reviewed  No history of anesthetic complications     Cardiovascular   Pulmonary       GI/Hepatic            Endo/Other     GYN       Hematology   Musculoskeletal       Neurology   Psychology           Physical Exam    Airway    Mallampati score: II  TM Distance: >3 FB  Neck ROM: full     Dental   No notable dental hx     Cardiovascular  Cardiovascular exam normal    Pulmonary  Pulmonary exam normal     Other Findings        Anesthesia Plan  ASA Score- 1     Anesthesia Type- general with ASA Monitors  Additional Monitors:   Airway Plan: ETT  Plan Factors-    Induction- intravenous  Postoperative Plan- Plan for postoperative opioid use  Informed Consent- Anesthetic plan and risks discussed with patient  I personally reviewed this patient with the CRNA  Discussed and agreed on the Anesthesia Plan with the CRNA  Sherre Soulier

## 2019-09-18 NOTE — ED PROVIDER NOTES
History  Chief Complaint   Patient presents with    Epigastric Pain     started as epigastric pain now RLQ pain, hurts when she lifts her leg  HX of ovarian cyst  Pain started 8pm yesterday  Pt in ER with c/o epigastric pain that began yesterday evening, and has since moved to RLQ  + anorexia  She denies fevers/chills  History provided by:  Patient   used: No    Abdominal Pain   Pain location:  RLQ and epigastric  Pain quality: aching    Onset quality:  Sudden  Timing:  Constant  Progression:  Unchanged  Chronicity:  New  Context: not sick contacts and not suspicious food intake    Relieved by:  Nothing  Worsened by:  Nothing  Ineffective treatments:  None tried  Associated symptoms: anorexia    Associated symptoms: no chills, no cough, no diarrhea, no dysuria, no fever, no hematuria, no nausea, no shortness of breath, no vaginal bleeding, no vaginal discharge and no vomiting        None       Past Medical History:   Diagnosis Date    Ovarian cyst        History reviewed  No pertinent surgical history  History reviewed  No pertinent family history  I have reviewed and agree with the history as documented  Social History     Tobacco Use    Smoking status: Never Smoker    Smokeless tobacco: Never Used   Substance Use Topics    Alcohol use: Yes     Comment: rarely    Drug use: No        Review of Systems   Constitutional: Negative for chills and fever  Respiratory: Negative for cough, chest tightness and shortness of breath  Gastrointestinal: Positive for abdominal pain and anorexia  Negative for diarrhea, nausea and vomiting  Genitourinary: Negative for dysuria, frequency, hematuria, urgency, vaginal bleeding and vaginal discharge  Musculoskeletal: Negative for back pain, neck pain and neck stiffness  All other systems reviewed and are negative  Physical Exam  Physical Exam   Constitutional: She is oriented to person, place, and time   She appears well-developed and well-nourished  No distress  HENT:   Head: Normocephalic and atraumatic  Eyes: Pupils are equal, round, and reactive to light  Conjunctivae are normal    Neck: Normal range of motion  Neck supple  Cardiovascular: Normal rate, regular rhythm and normal heart sounds  No murmur heard  Pulmonary/Chest: Effort normal and breath sounds normal  No respiratory distress  Abdominal: Soft  Bowel sounds are normal  She exhibits no distension  There is tenderness  There is guarding  There is no rebound  Musculoskeletal: Normal range of motion  She exhibits no edema or deformity  Neurological: She is alert and oriented to person, place, and time  No cranial nerve deficit  Skin: Skin is warm and dry  No rash noted  She is not diaphoretic  No pallor  Psychiatric: She has a normal mood and affect  Her behavior is normal    Nursing note and vitals reviewed        Vital Signs  ED Triage Vitals   Temperature Pulse Respirations Blood Pressure SpO2   09/18/19 0230 09/18/19 0230 09/18/19 0230 09/18/19 0230 09/18/19 0230   98 6 °F (37 °C) (!) 108 18 148/89 98 %      Temp Source Heart Rate Source Patient Position - Orthostatic VS BP Location FiO2 (%)   09/18/19 0230 09/18/19 0230 09/18/19 0230 09/18/19 0230 --   Oral Monitor Lying Left arm       Pain Score       09/18/19 0353       9           Vitals:    09/18/19 0230   BP: 148/89   Pulse: (!) 108   Patient Position - Orthostatic VS: Lying         Visual Acuity      ED Medications  Medications   levofloxacin (LEVAQUIN) IVPB (premix) 750 mg (750 mg Intravenous New Bag 9/18/19 0457)   metroNIDAZOLE (FLAGYL) IVPB (premix) 500 mg (has no administration in time range)   sodium chloride 0 9 % bolus 1,000 mL (1,000 mL Intravenous New Bag 9/18/19 0458)   morphine (PF) 4 mg/mL injection 4 mg (has no administration in time range)   ondansetron (ZOFRAN) injection 4 mg (4 mg Intravenous Given 9/18/19 0241)   iohexol (OMNIPAQUE) 350 MG/ML injection (MULTI-DOSE) 100 mL (100 mL Intravenous Given 9/18/19 0327)   ketorolac (TORADOL) injection 15 mg (15 mg Intravenous Given 9/18/19 0983)       Diagnostic Studies  Results Reviewed     Procedure Component Value Units Date/Time    Comprehensive metabolic panel [[id number]] Collected:  09/18/19 0242    Lab Status:  Final result Specimen:  Blood from Arm, Right Updated:  09/18/19 0306     Sodium 137 mmol/L      Potassium 3 6 mmol/L      Chloride 102 mmol/L      CO2 26 mmol/L      ANION GAP 9 mmol/L      BUN 11 mg/dL      Creatinine 0 80 mg/dL      Glucose 110 mg/dL      Calcium 8 8 mg/dL      AST 11 U/L      ALT 23 U/L      Alkaline Phosphatase 54 U/L      Total Protein 7 5 g/dL      Albumin 4 6 g/dL      Total Bilirubin 1 00 mg/dL      eGFR 101 ml/min/1 73sq m     Narrative:       [place] guidelines for Chronic Kidney Disease (CKD):     Stage 1 with normal or high GFR (GFR > 90 mL/min/1 73 square meters)    Stage 2 Mild CKD (GFR = 60-89 mL/min/1 73 square meters)    Stage 3A Moderate CKD (GFR = 45-59 mL/min/1 73 square meters)    Stage 3B Moderate CKD (GFR = 30-44 mL/min/1 73 square meters)    Stage 4 Severe CKD (GFR = 15-29 mL/min/1 73 square meters)    Stage 5 End Stage CKD (GFR <15 mL/min/1 73 square meters)  Note: GFR calculation is accurate only with a steady state creatinine    Lipase [[id number]]  (Abnormal) Collected:  09/18/19 0242    Lab Status:  Final result Specimen:  Blood from Arm, Right Updated:  09/18/19 0306     Lipase 71 u/L     Urine Microscopic [[id number]]  (Abnormal) Collected:  09/18/19 0245    Lab Status:  Final result Specimen:  Urine, Clean Catch Updated:  09/18/19 0300     RBC, UA None Seen /hpf      WBC, UA 2-4 /hpf      Epithelial Cells Occasional /hpf      Bacteria, UA Occasional /hpf     UA w Reflex to Microscopic w Reflex to Culture [[id number]]  (Abnormal) Collected:  09/18/19 0245    Lab Status:  Final result Specimen:  Urine, Clean Catch Updated:  09/18/19 0254     Color, UA Yellow     Clarity, UA Clear     Specific Gravity, UA 1 010     pH, UA 6 5     Leukocytes, UA Trace     Nitrite, UA Negative     Protein, UA Negative mg/dl      Glucose, UA Negative mg/dl      Ketones, UA Negative mg/dl      Urobilinogen, UA 0 2 E U /dl      Bilirubin, UA Negative     Blood, UA Negative    CBC and differential [[id number]]  (Abnormal) Collected:  09/18/19 0242    Lab Status:  Final result Specimen:  Blood from Arm, Right Updated:  09/18/19 0252     WBC 13 84 Thousand/uL      RBC 4 50 Million/uL      Hemoglobin 13 3 g/dL      Hematocrit 39 6 %      MCV 88 fL      MCH 29 6 pg      MCHC 33 6 g/dL      RDW 11 9 %      MPV 10 1 fL      Platelets 274 Thousands/uL      nRBC 0 /100 WBCs      Neutrophils Relative 83 %      Immat GRANS % 0 %      Lymphocytes Relative 10 %      Monocytes Relative 7 %      Eosinophils Relative 0 %      Basophils Relative 0 %      Neutrophils Absolute 11 31 Thousands/µL      Immature Grans Absolute 0 04 Thousand/uL      Lymphocytes Absolute 1 35 Thousands/µL      Monocytes Absolute 1 02 Thousand/µL      Eosinophils Absolute 0 06 Thousand/µL      Basophils Absolute 0 06 Thousands/µL     POCT pregnancy, urine [[id number]]  (Normal) Resulted:  09/18/19 0249    Lab Status:  Final result Specimen:  Urine Updated:  09/18/19 0249     EXT PREG TEST UR (Ref: Negative) negative     Control Valid                 CT abdomen pelvis with contrast   Final Result by Charlene Engle MD (09/18 0421)      1  Dilated hyperemic appendix more prominent than prior examination in 2017 which is concerning for appendicitis  2   4 2 x 3 3 x 4 3 cm cyst in the right ovary and trace free fluid in the pelvis  The study was marked in [facility] for immediate notification           Workstation performed: [id number]                    Procedures  Procedures       ED Course                               MDM  Number of Diagnoses or Management Options  Appendicitis:   Ovarian cyst:   Diagnosis management comments: Pt with acute appendicitis on CT  D/w Dr Anita Jean-Baptiste  He accepts pt to his service and requests IV abx  Pt stable at the time of dispo       Amount and/or Complexity of Data Reviewed  Clinical lab tests: ordered and reviewed  Tests in the radiology section of CPT®: ordered and reviewed    Risk of Complications, Morbidity, and/or Mortality  Presenting problems: moderate  Diagnostic procedures: moderate  Management options: moderate    Patient Progress  Patient progress: stable      Disposition  Final diagnoses:   Appendicitis   Ovarian cyst     Time reflects when diagnosis was documented in both MDM as applicable and the Disposition within this note     Time User Action Codes Description Comment    9/18/2019  4:46 AM Karen BOTELLO Add [K37] Appendicitis     9/18/2019  5:04 AM Karen Lyn Add [N83 [id number]] Ovarian cyst       ED Disposition     ED Disposition Condition Date/Time Comment    Admit Stable Wed Sep 18, 2019  4:45 AM Case was discussed with Dr Anita Jean-Baptiste and the patient's admission status was agreed to be Admission Status: inpatient status to the service of Dr Anita Jean-Baptiste   Follow-up Information    None         Patient's Medications    No medications on file     No discharge procedures on file      ED Provider  Electronically Signed by           Aquilino Dumont DO  09/18/19 6310

## 2019-09-18 NOTE — DISCHARGE INSTRUCTIONS
You just had a laparoscopic appendectomy, here is what you need to know:   - you should take it easy over the next 2 weeks prior to following up in the outpatient  - you will need follow-up in 2 weeks at the general surgery Office with Dr Coy Calvin  - no heavy lifting until your follow-up appointment (this includes nothing over 10-15 lb)   - light duty until otherwise noted  - regular diet is fine  - you may shower but no prolonged submersion of incisions under water (no hot tubs, no lakes, no Rivers, no pools)   - when you shower please do not use any exfoliating shower gels, bar soaps, or loofahs  - you will be given pain medication prescription prior to discharge  - please 1st start by taking ibuprofen prior to taking Percocet  - if pain persists please take Percocet as directed by our pharmacist  - call the office if he have any issues or need to follow up sooner      Laparoscopic Appendectomy   WHAT YOU NEED TO KNOW:   Laparoscopic appendectomy is surgery to remove your appendix  During this surgery, small incisions are made in your abdomen  A small scope and special tools are inserted through these incisions  A scope is a flexible tube with a light and camera on the end  DISCHARGE INSTRUCTIONS:   Medicines:   · Pain medicine: You may need medicine to take away or decrease pain  ¨ Learn how to take your medicine  Ask what medicine and how much you should take  Be sure you know how, when, and how often to take it  ¨ Do not wait until the pain is severe before you take your medicine  Tell caregivers if your pain does not decrease  ¨ Pain medicine can make you dizzy or sleepy  Prevent falls by calling someone when you get out of bed or if you need help  · Antibiotics: This medicine is given to fight or prevent an infection caused by bacteria  Always take your antibiotics exactly as ordered by your healthcare provider   Do not stop taking your medicine unless directed by your healthcare provider  Never save antibiotics or take leftover antibiotics that were given to you for another illness  · Take your medicine as directed  Contact your healthcare provider if you think your medicine is not helping or if you have side effects  Tell him or her if you are allergic to any medicine  Keep a list of the medicines, vitamins, and herbs you take  Include the amounts, and when and why you take them  Bring the list or the pill bottles to follow-up visits  Carry your medicine list with you in case of an emergency  Follow up with your healthcare provider as directed:  Write down your questions so you remember to ask them during your visits  Eat healthy foods:  Choose healthy foods from all the food groups every day  Include whole-grain bread, cereal, rice, and pasta  Eat a variety of fruits and vegetables, including dark green and orange vegetables  Include dairy products such as low-fat milk, yogurt, and cheese  Choose protein sources, such as lean beef and chicken, fish, beans, eggs, and nuts  Ask how many servings of fats, oils, and sweets you should have each day, and if you need to be on a special diet  Wound care:  When you are allowed to bathe or shower, carefully wash the incisions with soap and water  If you have bandages, change them any time they get wet or dirty  Ask your healthcare provider for more information about wound care  Contact your healthcare provider if:   · You are not able to have a bowel movement  · You have a fever  · You have chills, a cough, or feel weak and achy  · You have nausea or vomiting  · You have questions or concerns about your surgery, condition, or care  Seek care immediately or call 911 if:   · Blood soaks through your bandage  · You feel full and cannot burp or vomit  · You have pus or a foul-smelling odor coming from your wound  · Your vomit is greenish, looks like coffee grounds, or has blood in it    © 2017 [address] [facility] Information is for End User's use only and may not be sold, redistributed or otherwise used for commercial purposes  All illustrations and images included in CareNotes® are the copyrighted property of A D A M [employer] Inc  or [employer]akesh White  The above information is an  only  It is not intended as medical advice for individual conditions or treatments  Talk to your doctor, nurse or pharmacist before following any medical regimen to see if it is safe and effective for you

## 2019-09-18 NOTE — OP NOTE
OPERATIVE REPORT  PATIENT NAME: Abiel Griggs    :  1990  MRN: [id number]  Pt Location: [facility] OR ROOM 02    SURGERY DATE: 2019    Surgeon(s) and Role:     * Rosy Terry MD - Primary     * Akira Kline PA-C - Assisting    Preop Diagnosis:  Appendicitis [K37]    Post-Op Diagnosis Codes:     * Appendicitis [K37]    Procedure(s) (LRB):  APPENDECTOMY LAPAROSCOPIC (N/A)    Specimen(s):  ID Type Source Tests Collected by Time Destination   1 : Appendix Tissue Appendix TISSUE EXAM Rosy Terry MD 2019 1045        Estimated Blood Loss:   Minimal    Drains:  [REMOVED] NG/OG/Enteral Tube Orogastric 16 Fr Right mouth (Removed)   Number of days: 0       Anesthesia Type:   General    Operative Indications:  Appendicitis [K37]      Operative Findings:  Acute suppurative appendicitis  Benign right ovarian cyst    Complications:   None    Procedure and Technique:  Laparoscopic appendectomy  Patient was taken back to main operating room, placed supine on the operating table, general anesthesia was induced, and the abdomen was prepped and draped in normal fashion  Utilizing the Veress needle at the umbilicus, a pneumoperitoneum was created to 15 mm of mercury maintained at this level throughout the case  An 11 mm trocar was placed at the umbilicus  Two additional 5 mm trocars were placed in the lower abdomen  An acutely inflamed appendix was clearly identified  The mesoappendix was divided with the Harmonic scalpel  Endo-loops were placed around the base of the appendix and the appendix was transected and placed in the Endo-Catch bag  The Endo-Catch bag was removed from the umbilical port  The right ovary was noted to contained a benign appearing cyst      The fascial opening at the umbilicus was closed with 0 Vicryl suture  Four 0 Monocryl was used to approximate the skin edges  Histocryl was used as a dressing      The patient awoke from general anesthesia, was extubated in the operating room, sent to recovery room in stable condition      NAYANA Chance was required for technical assistance and retraction    I was present for the entire procedure and A qualified resident physician was not available    Patient Disposition:  PACU     SIGNATURE: Sreekanth Ross MD  DATE: September 18, 2019  TIME: 10:54 AM

## 2019-09-18 NOTE — PLAN OF CARE
Problem: PAIN - ADULT  Goal: Verbalizes/displays adequate comfort level or baseline comfort level  Description  Interventions:  - Encourage patient to monitor pain and request assistance  - Assess pain using appropriate pain scale  - Administer analgesics based on type and severity of pain and evaluate response  - Implement non-pharmacological measures as appropriate and evaluate response  - Consider cultural and social influences on pain and pain management  - Notify physician/advanced practitioner if interventions unsuccessful or patient reports new pain  Outcome: Progressing     Problem: INFECTION - ADULT  Goal: Absence or prevention of progression during hospitalization  Description  INTERVENTIONS:  - Assess and monitor for signs and symptoms of infection  - Monitor lab/diagnostic results  - Monitor all insertion sites, i e  indwelling lines, tubes, and drains  - Administer medications as ordered  - Instruct and encourage patient and family to use good hand hygiene technique  - Identify and instruct in appropriate isolation precautions for identified infection/condition   Outcome: Progressing     Problem: SAFETY ADULT  Goal: Patient will remain free of falls  Description  INTERVENTIONS:  - Assess patient frequently for physical needs  -  Identify cognitive and physical deficits and behaviors that affect risk of falls    -  [place] fall precautions as indicated by assessment   - Educate patient/family on patient safety including physical limitations  - Instruct patient to call for assistance with activity based on assessment  - Modify environment to reduce risk of injury  - Consider OT/PT consult to assist with strengthening/mobility  Outcome: Progressing     Problem: DISCHARGE PLANNING  Goal: Discharge to home or other facility with appropriate resources  Description  INTERVENTIONS:  - Identify barriers to discharge w/patient and caregiver  - Arrange for needed discharge resources and transportation as appropriate  - Identify discharge learning needs (meds, wound care, etc )   Outcome: Progressing     Problem: Knowledge Deficit  Goal: Patient/family/caregiver demonstrates understanding of disease process, treatment plan, medications, and discharge instructions  Description  Complete learning assessment and assess knowledge base    Interventions:  - Provide teaching at level of understanding  - Provide teaching via preferred learning methods  Outcome: Progressing

## 2019-09-19 VITALS
HEIGHT: 71 IN | WEIGHT: 157.19 LBS | BODY MASS INDEX: 22.01 KG/M2 | RESPIRATION RATE: 18 BRPM | OXYGEN SATURATION: 99 % | HEART RATE: 84 BPM | SYSTOLIC BLOOD PRESSURE: 120 MMHG | DIASTOLIC BLOOD PRESSURE: 77 MMHG | TEMPERATURE: 98.6 F

## 2019-09-19 LAB — MRSA NOSE QL CULT: NORMAL

## 2019-09-19 PROCEDURE — NC001 PR NO CHARGE: Performed by: PHYSICIAN ASSISTANT

## 2019-09-19 PROCEDURE — 99024 POSTOP FOLLOW-UP VISIT: CPT | Performed by: PHYSICIAN ASSISTANT

## 2019-09-19 RX ADMIN — LEVOFLOXACIN 750 MG: 5 INJECTION, SOLUTION INTRAVENOUS at 04:53

## 2019-09-19 RX ADMIN — KETOROLAC TROMETHAMINE 15 MG: 30 INJECTION INTRAMUSCULAR; INTRAVENOUS at 04:52

## 2019-09-19 RX ADMIN — METRONIDAZOLE 500 MG: 500 INJECTION, SOLUTION INTRAVENOUS at 06:44

## 2019-09-19 RX ADMIN — KETOROLAC TROMETHAMINE 15 MG: 30 INJECTION INTRAMUSCULAR; INTRAVENOUS at 11:32

## 2019-09-19 NOTE — UTILIZATION REVIEW
Out Patient / No Charge Bed  09/18/19 1101  Outpatient No Charge Bed Once     Transfer Service: Surgery-General       Question: Admitting Physician Answer: Vicky Shah          Procedure:  SURGERY DATE: 9/18/2019  Procedure(s) (LRB):  APPENDECTOMY LAPAROSCOPIC   Anesthesia Type: General  Operative Findings:  Acute suppurative appendicitis  Benign right ovarian cyst    Vital Signs:  /77 (BP Location: Left arm)   Pulse 84   Temp 98 1 °F (36 7 °C) (Oral)   Resp 18   Ht 5' 11" (1 803 m)   Wt 71 3 kg (157 lb 3 oz)   LMP 08/31/2019   SpO2 99%   Breastfeeding?  No   BMI 21 92 kg/m²   Regular diet  Up as tolerated  Luis Felipe SCDs  Medications:  Scheduled Meds:  Current Facility-Administered Medications:  HYDROmorphone 0 5 mg Intravenous Q4H PRN   ketorolac 15 mg Intravenous Q6H x 4 doses   levofloxacin 750 mg Intravenous Q24H   metroNIDAZOLE 500 mg Intravenous Q8H   ondansetron 4 mg Intravenous Q6H PRN   oxyCODONE-acetaminophen 1 tablet Oral Q4H PRN   sodium chloride 75 mL/hr Intravenous Continuous

## 2019-09-19 NOTE — DISCHARGE SUMMARY
Discharge Summary - Gurmeet Wolff 33 y o  female MRN: [id number]    Unit/Bed#: [address] [address] Encounter: [id number]    Admission Date:   Admission Orders (From admission, onward)     Ordered        09/18/19 0446  Inpatient Admission  Once                     Admitting Diagnosis: Ovarian cyst [N83 209]  Epigastric pain [R10 13]  Appendicitis [K37]    Per myself 9/19/19  HPI: Gurmeet Wolff is a 33 y o  female a past medical history of ovarian cyst that was suspected to have ruptured ovarian cyst in the past presenting to the acute care surgery team due to recurrent right lower quadrant pain  Patient reports that she has had waxing and waning abdominal pain over the course approximately 2 years  Patient reports she has these acute flares that cause pain in the right lower quadrant and sometimes radiates to the upper right quadrant  Patient reports that she has been treated with conservative management in the past and improved but since then will recur with the pain  Patient reports this time feels very similar to what had happened last time in that she had the onset of a generalized abdominal pain that was migratory to the right lower quadrant and although last time it was thought or suspected to be an ovarian cyst that had ruptured that this is become little bit more significant in tenderness  Patient reports the pain is more like a sharp and stabbing pain in the right lower quadrant with a gas see aching pain that radiates to the right upper quadrant at times  Patient reports that it is moderate to severe in nature depending on positioning  Patient reports that she also has some nausea as well as anorexia  Patient has not had any vomiting  Patient does have fevers and chills    Patient had wanted to proceed with conservative measures in the past but believes that due to the level of recurrence with the pain being in the right lower quadrant if appendix is suggested on CT scan that she is willing to pursue surgical means  Patient denies any other significant finding such as change in dietary intake, contaminated water space is cut, contaminated food, recent antibiotics  Patient also denies any changes in her bowel movements  No diarrhea or melena  Patient does report a pain that seemed a little like bladder pain but denies any dysuria  Patient also reports pain with flexion and extension of leg that sound suggestive somewhat of psoas sign  Procedures Performed: No orders of the defined types were placed in this encounter  Summary of Hospital Course:  Patient was seen and examined 9/18/19 due to recurrent right lower quadrant tenderness which was suggestive of acute appendicitis  Patient had incidences like this in the previous encounters that were attributed more to her ovarian cyst issues  Based on the recurrence of the issue as well as the high Castillo score index patient was recommended for diagnostic laparoscopy and possible laparoscopic appendectomy  Consent was obtained and patient was planned for same-day procedure  Patient 9/18/19 had laparoscopic appendectomy without any acute or immediate complications  Patient was kept overnight in order to observe  Postoperative day 1 patient was tolerating regular diet pain was well controlled and she was up and out of bed and ambulating routinely  Patient was given specific instructions on how to shower, not to do any heavy lifting, when to follow up, when to go to work, had take pain medications  Patient was discharged postoperative day 1, 9/19/19    Significant Findings, Care, Treatment and Services Provided: acute appendicitis, laparoscopic appendectomy    Complications: none    Discharge Diagnosis: acute appendicitis    Resolved Problems  Date Reviewed: 9/18/2019    None          Condition at Discharge: good         Discharge instructions/Information to patient and family:   See after visit summary for information provided to patient and family  Provisions for Follow-Up Care:  See after visit summary for information related to follow-up care and any pertinent home health orders  PCP: No primary care provider on file  Disposition: Home    Planned Readmission: No      Discharge Statement   I spent 10 minutes discharging the patient  This time was spent on the day of discharge  I had direct contact with the patient on the day of discharge  Additional documentation is required if more than 30 minutes were spent on discharge  Discharge Medications:  See after visit summary for reconciled discharge medications provided to patient and family

## 2019-09-19 NOTE — PROGRESS NOTES
Progress Note - General Surgery   Sabrina Mcwilliams 33 y o  female MRN: [id number]  Unit/Bed#: [address] [address] Encounter: [id number]    Assessment:  POD #1 s/p lap appy - tolerating well, tolerating diet, reduced abdominal pain, no nausea or vomiting, up ambulating, AVSS    Plan:  POD #1 s/p lap appy -   - discharge today 9/19/19  - discontinue IV antibiotics  - discharge with p o  Percocet  - patient education regarding when to follow up and what to avoid postsurgically  - discontinue IV and IV fluids  - regular diet    Subjective/Objective   Chief Complaint:  My pain is much less than it was yesterday    Subjective:  Patient was seen examined at bedside  Patient denies any nausea or vomiting  Patient reports that her pain has reduced significantly compared to yesterday and that she only has pain around her incision sites  Patient was educated that this is a normal occurrence  Patient has been tolerating her diet without any issues  Patient has been up and out of bed and ambulating  Patient like to go home if possible  Objective:     Blood pressure 120/77, pulse 84, temperature 98 6 °F (37 °C), temperature source Oral, resp  rate 18, height 5' 11" (1 803 m), weight 71 3 kg (157 lb 3 oz), last menstrual period 08/31/2019, SpO2 99 %, not currently breastfeeding  ,Body mass index is 21 92 kg/m²  Intake/Output Summary (Last 24 hours) at 9/19/2019 0944  Last data filed at 9/18/2019 1201  Gross per 24 hour   Intake 700 ml   Output 0 ml   Net 700 ml       Invasive Devices     Peripheral Intravenous Line            Peripheral IV 09/18/19 Right Antecubital 1 day                Physical Exam: /77 (BP Location: Left arm)   Pulse 84   Temp 98 6 °F (37 °C) (Oral)   Resp 18   Ht 5' 11" (1 803 m)   Wt 71 3 kg (157 lb 3 oz)   LMP 08/31/2019   SpO2 99%   Breastfeeding?  No   BMI 21 92 kg/m²   General appearance: alert and oriented, in no acute distress  Head: Normocephalic, without obvious abnormality, atraumatic  Lungs: clear to auscultation bilaterally  Heart: regular rate and rhythm, S1, S2 normal, no murmur, click, rub or gallop  Abdomen: soft, non-tender; bowel sounds normal; no masses,  no organomegaly and Slight incisional tenderness  Skin: Skin color, texture, turgor normal  No rashes or lesions or Incisions are clean, dry, intact, inferior suprapubic incision does have some slight area of raised scabbed completely normal within postoperative window    Lab, Imaging and other studies:I have personally reviewed pertinent lab results    ,   VTE Pharmacologic Prophylaxis: Enoxaparin (Lovenox)  VTE Mechanical Prophylaxis: sequential compression device

## 2019-09-19 NOTE — PROGRESS NOTES
IV removed  Discharge instructions reviewed and understood by patient  Given opportunities to ask questions  Patient left in stable condition, walking with a steady gait and accompanied by significant other

## 2019-09-19 NOTE — PLAN OF CARE
Problem: PAIN - ADULT  Goal: Verbalizes/displays adequate comfort level or baseline comfort level  Description  Interventions:  - Encourage patient to monitor pain and request assistance  - Assess pain using appropriate pain scale  - Administer analgesics based on type and severity of pain and evaluate response  - Implement non-pharmacological measures as appropriate and evaluate response  - Consider cultural and social influences on pain and pain management  - Notify physician/advanced practitioner if interventions unsuccessful or patient reports new pain  Outcome: Adequate for Discharge     Problem: INFECTION - ADULT  Goal: Absence or prevention of progression during hospitalization  Description  INTERVENTIONS:  - Assess and monitor for signs and symptoms of infection  - Monitor lab/diagnostic results  - Monitor all insertion sites, i e  indwelling lines, tubes, and drains  - Administer medications as ordered  - Instruct and encourage patient and family to use good hand hygiene technique  - Identify and instruct in appropriate isolation precautions for identified infection/condition   Outcome: Adequate for Discharge     Problem: SAFETY ADULT  Goal: Patient will remain free of falls  Description  INTERVENTIONS:  - Assess patient frequently for physical needs  -  Identify cognitive and physical deficits and behaviors that affect risk of falls    -  [place] fall precautions as indicated by assessment   - Educate patient/family on patient safety including physical limitations  - Instruct patient to call for assistance with activity based on assessment  - Modify environment to reduce risk of injury  - Consider OT/PT consult to assist with strengthening/mobility  Outcome: Adequate for Discharge     Problem: DISCHARGE PLANNING  Goal: Discharge to home or other facility with appropriate resources  Description  INTERVENTIONS:  - Identify barriers to discharge w/patient and caregiver  - Arrange for needed discharge resources and transportation as appropriate  - Identify discharge learning needs (meds, wound care, etc )   Outcome: Adequate for Discharge     Problem: Knowledge Deficit  Goal: Patient/family/caregiver demonstrates understanding of disease process, treatment plan, medications, and discharge instructions  Description  Complete learning assessment and assess knowledge base    Interventions:  - Provide teaching at level of understanding  - Provide teaching via preferred learning methods  Outcome: Adequate for Discharge

## 2019-10-03 ENCOUNTER — OFFICE VISIT (OUTPATIENT)
Dept: SURGERY | Facility: CLINIC | Age: 29
End: 2019-10-03

## 2019-10-03 VITALS
HEIGHT: 71 IN | WEIGHT: 159.17 LBS | SYSTOLIC BLOOD PRESSURE: 130 MMHG | BODY MASS INDEX: 22.28 KG/M2 | DIASTOLIC BLOOD PRESSURE: 88 MMHG | TEMPERATURE: 97.3 F

## 2019-10-03 DIAGNOSIS — Z09 POSTOPERATIVE EXAMINATION: Primary | ICD-10-CM

## 2019-10-03 PROBLEM — R10.9 ABDOMINAL PAIN: Status: RESOLVED | Noted: 2017-09-18 | Resolved: 2019-10-03

## 2019-10-03 PROCEDURE — 99024 POSTOP FOLLOW-UP VISIT: CPT | Performed by: SURGERY

## 2019-10-03 NOTE — PROGRESS NOTES
Patient is here for 1st visit following laparoscopic appendectomy  Tolerating a regular diet with normal bowel function  Denies any fevers chills  Minimal pain complaints and no wound complaints  Path:  Appendix (appendectomy):     - Acute appendicitis and serositis  - Few foci suggesting endometriosis  - No malignancy identified  Incisions healing well  Patient counseled regarding pathology report and to follow up with OBGYN for any issues consistent with endometriosis  Follow-up p r n

## 2020-12-22 ENCOUNTER — NURSE TRIAGE (OUTPATIENT)
Dept: OTHER | Facility: OTHER | Age: 30
End: 2020-12-22

## 2020-12-28 ENCOUNTER — NURSE TRIAGE (OUTPATIENT)
Dept: OTHER | Facility: OTHER | Age: 30
End: 2020-12-28

## 2020-12-28 DIAGNOSIS — U07.1 COVID-19: ICD-10-CM

## 2020-12-28 DIAGNOSIS — U07.1 COVID-19: Primary | ICD-10-CM

## 2020-12-28 PROCEDURE — U0003 INFECTIOUS AGENT DETECTION BY NUCLEIC ACID (DNA OR RNA); SEVERE ACUTE RESPIRATORY SYNDROME CORONAVIRUS 2 (SARS-COV-2) (CORONAVIRUS DISEASE [COVID-19]), AMPLIFIED PROBE TECHNIQUE, MAKING USE OF HIGH THROUGHPUT TECHNOLOGIES AS DESCRIBED BY CMS-2020-01-R: HCPCS | Performed by: FAMILY MEDICINE

## 2020-12-29 LAB — SARS-COV-2 RNA SPEC QL NAA+PROBE: DETECTED

## 2020-12-30 ENCOUNTER — TELEPHONE (OUTPATIENT)
Dept: DERMATOLOGY | Facility: CLINIC | Age: 30
End: 2020-12-30

## 2022-06-15 ENCOUNTER — OFFICE VISIT (OUTPATIENT)
Dept: OBGYN CLINIC | Facility: CLINIC | Age: 32
End: 2022-06-15
Payer: COMMERCIAL

## 2022-06-15 ENCOUNTER — APPOINTMENT (OUTPATIENT)
Dept: RADIOLOGY | Facility: CLINIC | Age: 32
End: 2022-06-15
Payer: COMMERCIAL

## 2022-06-15 VITALS
HEART RATE: 89 BPM | WEIGHT: 159 LBS | DIASTOLIC BLOOD PRESSURE: 105 MMHG | HEIGHT: 71 IN | BODY MASS INDEX: 22.26 KG/M2 | SYSTOLIC BLOOD PRESSURE: 138 MMHG

## 2022-06-15 DIAGNOSIS — M25.511 RIGHT SHOULDER PAIN, UNSPECIFIED CHRONICITY: ICD-10-CM

## 2022-06-15 DIAGNOSIS — S46.011A ROTATOR CUFF STRAIN, RIGHT, INITIAL ENCOUNTER: Primary | ICD-10-CM

## 2022-06-15 PROCEDURE — 99204 OFFICE O/P NEW MOD 45 MIN: CPT | Performed by: ORTHOPAEDIC SURGERY

## 2022-06-15 PROCEDURE — 73030 X-RAY EXAM OF SHOULDER: CPT

## 2022-06-15 NOTE — PROGRESS NOTES
Assessment/Plan:  1  Rotator cuff strain, right, initial encounter  XR shoulder 2+ vw right    Ambulatory referral to Physical Therapy       Sherman Brooks has right-sided shoulder pain consistent with rotator cuff strain  She does not have significant weakness which would be concerning for rotator cuff tear at this time  She only has some mild discomfort and loss of range of motion in her shoulder  She has no concerning findings on x-ray or exam today  I recommended undergoing conservative measures of physical therapy for her right shoulder and strain of her rotator cuff at this time  I have also recommended over-the-counter anti-inflammatories and ice as needed  If the pain persists or worsens we could consider subacromial cortisone injection or further imaging  Follow-up after therapy  Subjective:   Yury Gardiner is a 28 y o  female who presents to the office for evaluation for right-sided shoulder pain  She had an injury 3 weeks ago when she was caring heavy bag into her car she went toss it into the passenger seat  She felt discomfort over the superior aspect of her right shoulder  She states that prior to that injury she had been back in the gym doing more exercises particularly shoulder presses  She is unsure if that was related to this injury  She states ever since the injury she feels like she cannot do the shoulder exercises as well and has been only doing cardio exercises in the gym  She denies any numbness or tingling radiating pain down her arm  She denies any significant weakness  Review of Systems   Constitutional: Negative for chills, fever and unexpected weight change  HENT: Negative for hearing loss, nosebleeds and sore throat  Eyes: Negative for pain, redness and visual disturbance  Respiratory: Negative for cough, shortness of breath and wheezing  Cardiovascular: Negative for chest pain, palpitations and leg swelling     Gastrointestinal: Negative for abdominal pain, nausea and vomiting  Endocrine: Negative for polydipsia and polyuria  Genitourinary: Negative for dysuria and hematuria  Musculoskeletal:        See HPI   Skin: Negative for rash and wound  Neurological: Negative for dizziness, numbness and headaches  Psychiatric/Behavioral: Negative for decreased concentration and suicidal ideas  The patient is not nervous/anxious  Past Medical History:   Diagnosis Date    Ovarian cyst        Past Surgical History:   Procedure Laterality Date    [place] LAP,APPENDECTOMY N/A 9/18/2019    Procedure: APPENDECTOMY LAPAROSCOPIC;  Surgeon: Dinora Cramer MD;  Location: [address] [place] [address];  Service: General       No family history on file  Social History     Occupational History    Not on file   Tobacco Use    Smoking status: Never Smoker    Smokeless tobacco: Never Used   Substance and Sexual Activity    Alcohol use: Yes     Alcohol/week: 1 0 standard drink     Types: 1 Cans of beer per week     Comment: rarely    Drug use: No    Sexual activity: Yes     Partners: Male       No current outpatient medications on file  Allergies   Allergen Reactions    Penicillins Rash       Objective:  Vitals:    06/15/22 0927   BP: (!) 138/105   Pulse: 89       Right Shoulder Exam     Range of Motion   Active abduction:  160 abnormal   External rotation: normal   Forward flexion:  170 abnormal   Internal rotation 0 degrees:  Sacrum abnormal     Muscle Strength   Abduction: 5/5   Internal rotation: 5/5   External rotation: 5/5   Supraspinatus: 5/5   Subscapularis: 5/5   Biceps: 5/5     Tests   Carrillo test: negative  Impingement: positive  Drop arm: positive    Other   Erythema: absent  Sensation: normal  Pulse: present    Comments:  Negative [place]'s test  Negative empty can  Positive Neer's            Physical Exam  Vitals and nursing note reviewed  Constitutional:       Appearance: She is well-developed  HENT:      Head: Normocephalic and atraumatic     Eyes:      General: No scleral icterus  Conjunctiva/sclera: Conjunctivae normal    Cardiovascular:      Rate and Rhythm: Normal rate  Pulmonary:      Effort: Pulmonary effort is normal  No respiratory distress  Musculoskeletal:      Cervical back: Normal range of motion and neck supple  Comments: As noted in HPI   Skin:     General: Skin is warm and dry  Neurological:      Mental Status: She is alert and oriented to person, place, and time  Psychiatric:         Behavior: Behavior normal        I have personally reviewed pertinent films in PACS and my interpretation is as follows: Three-view x-ray of the right shoulder demonstrates no of acute fracture

## 2024-07-25 ENCOUNTER — OFFICE VISIT (OUTPATIENT)
Dept: URGENT CARE | Facility: CLINIC | Age: 34
End: 2024-07-25
Payer: COMMERCIAL

## 2024-07-25 VITALS
DIASTOLIC BLOOD PRESSURE: 80 MMHG | WEIGHT: 185.2 LBS | BODY MASS INDEX: 25.83 KG/M2 | SYSTOLIC BLOOD PRESSURE: 124 MMHG | RESPIRATION RATE: 14 BRPM | TEMPERATURE: 97 F | OXYGEN SATURATION: 100 % | HEART RATE: 108 BPM

## 2024-07-25 DIAGNOSIS — N30.00 ACUTE CYSTITIS WITHOUT HEMATURIA: Primary | ICD-10-CM

## 2024-07-25 LAB
SL AMB  POCT GLUCOSE, UA: NEGATIVE
SL AMB LEUKOCYTE ESTERASE,UA: ABNORMAL
SL AMB POCT BILIRUBIN,UA: NEGATIVE
SL AMB POCT BLOOD,UA: ABNORMAL
SL AMB POCT CLARITY,UA: ABNORMAL
SL AMB POCT COLOR,UA: YELLOW
SL AMB POCT KETONES,UA: NEGATIVE
SL AMB POCT NITRITE,UA: NEGATIVE
SL AMB POCT PH,UA: 5
SL AMB POCT SPECIFIC GRAVITY,UA: 1
SL AMB POCT URINE PROTEIN: ABNORMAL
SL AMB POCT UROBILINOGEN: NEGATIVE

## 2024-07-25 PROCEDURE — 99213 OFFICE O/P EST LOW 20 MIN: CPT | Performed by: FAMILY MEDICINE

## 2024-07-25 PROCEDURE — 81002 URINALYSIS NONAUTO W/O SCOPE: CPT | Performed by: FAMILY MEDICINE

## 2024-07-25 RX ORDER — NITROFURANTOIN 25; 75 MG/1; MG/1
100 CAPSULE ORAL 2 TIMES DAILY
Qty: 10 CAPSULE | Refills: 0 | Status: SHIPPED | OUTPATIENT
Start: 2024-07-25

## 2024-07-25 RX ORDER — HYDROXYZINE PAMOATE 25 MG/1
25 CAPSULE ORAL 3 TIMES DAILY PRN
COMMUNITY
Start: 2024-06-19

## 2024-07-25 NOTE — PROGRESS NOTES
[facility]        NAME: Frances Chris is a 33 y.o. female  : 1990    MRN: [id number]  DATE: 2024  TIME: 8:41 AM    Assessment and Plan   Acute cystitis without hematuria [N30.00]  1. Acute cystitis without hematuria  POCT urine dip    Urine culture    Urine culture    nitrofurantoin (MACROBID) 100 mg capsule            Patient Instructions     Urine Dip completed today showing large leuks and large blood. Will be sent for culture. Antibiotics given today.     Follow-up with PCP in the next 1-2 days for re-evaluation if symptoms continue or worsen  Go to the ED if any fevers, malaise, flank pain, new or worsening symptoms or other concerning symptoms.     Chief Complaint     Chief Complaint   Patient presents with   • Possible UTI     Frequency of urination, doesn't feel emptied after using the bathroom for 2 days.          History of Present Illness     Frances Chris is a 33 y.o. female presenting to the office today for dysuria. Symptoms have been present for 2 days, and include urgency, frequency and burning. She has tried nothing for her symptoms.    Review of Systems     Review of Systems   Constitutional:  Negative for chills and fever.   HENT:  Negative for congestion, postnasal drip and sore throat.    Respiratory:  Negative for cough, shortness of breath and wheezing.    Cardiovascular:  Negative for chest pain.   Gastrointestinal:  Negative for abdominal distention, abdominal pain (suprapubic pain), nausea and vomiting.   Genitourinary:  Positive for dysuria, frequency and urgency. Negative for flank pain and hematuria.   Musculoskeletal:  Negative for back pain.   Neurological:  Negative for dizziness, syncope, light-headedness and headaches.   Psychiatric/Behavioral:  Negative for agitation and confusion.    All other systems reviewed and are negative.      Current Medications       Current Outpatient Medications:   •  hydrOXYzine pamoate (VISTARIL) 25 mg capsule, Take 25 mg  by mouth 3 (three) times a day as needed for anxiety, Disp: , Rfl:   •  nitrofurantoin (MACROBID) 100 mg capsule, Take 1 capsule (100 mg total) by mouth 2 (two) times a day, Disp: 10 capsule, Rfl: 0  •  sertraline (ZOLOFT) 50 mg tablet, Take 50 mg by mouth daily, Disp: , Rfl:     Current Allergies     Allergies as of 07/25/2024 - Reviewed 07/25/2024   Allergen Reaction Noted   • Penicillins Rash 09/17/2017            The following portions of the patient's history were reviewed and updated as appropriate: allergies, current medications, past family history, past medical history, past social history, past surgical history and problem list.     Past Medical History:   Diagnosis Date   • Ovarian cyst        Past Surgical History:   Procedure Laterality Date   • MA LAPAROSCOPIC APPENDECTOMY N/A 9/18/2019    Procedure: APPENDECTOMY LAPAROSCOPIC;  Surgeon: Phillip Olvera MD;  Location: [facility];  Service: General       No family history on file.    Medications have been verified.    Objective     /80   Pulse (!) 108   Temp (!) 97 °F (36.1 °C)   Resp 14   Wt 84 kg (185 lb 3.2 oz)   LMP 07/17/2024 (Exact Date)   SpO2 100%   BMI 25.83 kg/m²   Patient's last menstrual period was 07/17/2024 (exact date).     Physical Exam     Physical Exam  Vitals reviewed.   Constitutional:       General: She is not in acute distress.     Appearance: Normal appearance. She is not ill-appearing.   HENT:      Head: Normocephalic and atraumatic.   Eyes:      Extraocular Movements: Extraocular movements intact.      Conjunctiva/sclera: Conjunctivae normal.   Cardiovascular:      Rate and Rhythm: Normal rate and regular rhythm.      Pulses: Normal pulses.      Heart sounds: Normal heart sounds. No murmur heard.  Pulmonary:      Effort: Pulmonary effort is normal. No respiratory distress.      Breath sounds: Normal breath sounds.   Abdominal:      General: Bowel sounds are normal.      Tenderness: There is no abdominal tenderness  (suprapubic).   Musculoskeletal:      Cervical back: Normal range of motion.   Skin:     General: Skin is warm.   Neurological:      General: No focal deficit present.      Mental Status: She is alert.   Psychiatric:         Mood and Affect: Mood normal.         Behavior: Behavior normal.         Judgment: Judgment normal.

## 2024-07-30 LAB
BACTERIA UR CULT: ABNORMAL
Lab: ABNORMAL
SL AMB ANTIMICROBIAL SUSCEPTIBILITY: ABNORMAL

## (undated) DEVICE — HARMONIC ACE 5MM DIAMETER SHEARS 36CM SHAFT LENGTH + ADAPTIVE TISSUE TECHNOLOGY FOR USE WITH GENERATOR G11: Brand: HARMONIC ACE

## (undated) DEVICE — TIBURON GENERAL ENDOSCOPY DRAPE: Brand: CONVERTORS

## (undated) DEVICE — CHLORAPREP HI-LITE 26ML ORANGE

## (undated) DEVICE — SUT MONOCRYL 4-0 PS-2 27 IN Y426H

## (undated) DEVICE — PDS II VLT 0 107CM AG ST3: Brand: ENDOLOOP

## (undated) DEVICE — BASIC DOUBLE BASIN 2-LF: Brand: MEDLINE INDUSTRIES, INC.

## (undated) DEVICE — TROCAR: Brand: KII® SLEEVE

## (undated) DEVICE — SUT VICRYL 0 UR-6 27 IN J603H

## (undated) DEVICE — TISSUE RETRIEVAL SYSTEM: Brand: INZII RETRIEVAL SYSTEM

## (undated) DEVICE — INTENDED FOR TISSUE SEPARATION, AND OTHER PROCEDURES THAT REQUIRE A SHARP SURGICAL BLADE TO PUNCTURE OR CUT.: Brand: BARD-PARKER SAFETY BLADES SIZE 11, STERILE

## (undated) DEVICE — PACK GENERAL LF

## (undated) DEVICE — Device

## (undated) DEVICE — TUBING SMOKE EVAC W/FILTRATION DEVICE PLUMEPORT ACTIV

## (undated) DEVICE — TROCAR: Brand: KII FIOS FIRST ENTRY

## (undated) DEVICE — INSUFFLATION NEEDLE TO ESTABLISH PNEUMOPERITONEUM.: Brand: INSUFFLATION NEEDLE

## (undated) DEVICE — GLOVE SRG BIOGEL 8

## (undated) DEVICE — ADHESIVE SKN CLSR HISTOACRYL FLEX 0.5ML LF

## (undated) DEVICE — GLOVE INDICATOR PI UNDERGLOVE SZ 7.5 BLUE